# Patient Record
Sex: FEMALE | Race: WHITE | NOT HISPANIC OR LATINO | Employment: FULL TIME | URBAN - METROPOLITAN AREA
[De-identification: names, ages, dates, MRNs, and addresses within clinical notes are randomized per-mention and may not be internally consistent; named-entity substitution may affect disease eponyms.]

---

## 2019-05-07 ENCOUNTER — APPOINTMENT (OUTPATIENT)
Dept: RADIOLOGY | Facility: MEDICAL CENTER | Age: 49
End: 2019-05-07
Attending: EMERGENCY MEDICINE
Payer: COMMERCIAL

## 2019-05-07 ENCOUNTER — HOSPITAL ENCOUNTER (EMERGENCY)
Facility: MEDICAL CENTER | Age: 49
End: 2019-05-07
Attending: EMERGENCY MEDICINE
Payer: COMMERCIAL

## 2019-05-07 VITALS
DIASTOLIC BLOOD PRESSURE: 102 MMHG | HEART RATE: 75 BPM | HEIGHT: 67 IN | RESPIRATION RATE: 17 BRPM | WEIGHT: 201.06 LBS | BODY MASS INDEX: 31.56 KG/M2 | TEMPERATURE: 97.9 F | SYSTOLIC BLOOD PRESSURE: 169 MMHG | OXYGEN SATURATION: 96 %

## 2019-05-07 DIAGNOSIS — R20.2 PARESTHESIA: ICD-10-CM

## 2019-05-07 DIAGNOSIS — H53.8 BLURRED VISION, LEFT EYE: ICD-10-CM

## 2019-05-07 LAB
ABO + RH BLD: NORMAL
ABO GROUP BLD: NORMAL
ALBUMIN SERPL BCP-MCNC: 4.8 G/DL (ref 3.2–4.9)
ALBUMIN/GLOB SERPL: 1.8 G/DL
ALP SERPL-CCNC: 77 U/L (ref 30–99)
ALT SERPL-CCNC: 16 U/L (ref 2–50)
ANION GAP SERPL CALC-SCNC: 11 MMOL/L (ref 0–11.9)
APTT PPP: 26.7 SEC (ref 24.7–36)
AST SERPL-CCNC: 17 U/L (ref 12–45)
BASOPHILS # BLD AUTO: 0.8 % (ref 0–1.8)
BASOPHILS # BLD: 0.08 K/UL (ref 0–0.12)
BILIRUB SERPL-MCNC: 0.3 MG/DL (ref 0.1–1.5)
BLD GP AB SCN SERPL QL: NORMAL
BUN SERPL-MCNC: 9 MG/DL (ref 8–22)
CALCIUM SERPL-MCNC: 9.6 MG/DL (ref 8.5–10.5)
CHLORIDE SERPL-SCNC: 103 MMOL/L (ref 96–112)
CO2 SERPL-SCNC: 25 MMOL/L (ref 20–33)
CREAT SERPL-MCNC: 0.79 MG/DL (ref 0.5–1.4)
EKG IMPRESSION: NORMAL
EOSINOPHIL # BLD AUTO: 0.11 K/UL (ref 0–0.51)
EOSINOPHIL NFR BLD: 1.2 % (ref 0–6.9)
ERYTHROCYTE [DISTWIDTH] IN BLOOD BY AUTOMATED COUNT: 41.3 FL (ref 35.9–50)
GLOBULIN SER CALC-MCNC: 2.7 G/DL (ref 1.9–3.5)
GLUCOSE BLD-MCNC: 90 MG/DL (ref 65–99)
GLUCOSE SERPL-MCNC: 91 MG/DL (ref 65–99)
HCT VFR BLD AUTO: 45.2 % (ref 37–47)
HGB BLD-MCNC: 15.4 G/DL (ref 12–16)
IMM GRANULOCYTES # BLD AUTO: 0.04 K/UL (ref 0–0.11)
IMM GRANULOCYTES NFR BLD AUTO: 0.4 % (ref 0–0.9)
INR PPP: 0.94 (ref 0.87–1.13)
LYMPHOCYTES # BLD AUTO: 2.09 K/UL (ref 1–4.8)
LYMPHOCYTES NFR BLD: 22 % (ref 22–41)
MCH RBC QN AUTO: 29.8 PG (ref 27–33)
MCHC RBC AUTO-ENTMCNC: 34.1 G/DL (ref 33.6–35)
MCV RBC AUTO: 87.6 FL (ref 81.4–97.8)
MONOCYTES # BLD AUTO: 0.79 K/UL (ref 0–0.85)
MONOCYTES NFR BLD AUTO: 8.3 % (ref 0–13.4)
NEUTROPHILS # BLD AUTO: 6.41 K/UL (ref 2–7.15)
NEUTROPHILS NFR BLD: 67.3 % (ref 44–72)
NRBC # BLD AUTO: 0 K/UL
NRBC BLD-RTO: 0 /100 WBC
PLATELET # BLD AUTO: 335 K/UL (ref 164–446)
PMV BLD AUTO: 10.3 FL (ref 9–12.9)
POTASSIUM SERPL-SCNC: 3.7 MMOL/L (ref 3.6–5.5)
PROT SERPL-MCNC: 7.5 G/DL (ref 6–8.2)
PROTHROMBIN TIME: 12.7 SEC (ref 12–14.6)
RBC # BLD AUTO: 5.16 M/UL (ref 4.2–5.4)
RH BLD: NORMAL
SODIUM SERPL-SCNC: 139 MMOL/L (ref 135–145)
TROPONIN I SERPL-MCNC: <0.01 NG/ML (ref 0–0.04)
WBC # BLD AUTO: 9.5 K/UL (ref 4.8–10.8)

## 2019-05-07 PROCEDURE — 84484 ASSAY OF TROPONIN QUANT: CPT

## 2019-05-07 PROCEDURE — 80053 COMPREHEN METABOLIC PANEL: CPT

## 2019-05-07 PROCEDURE — 85610 PROTHROMBIN TIME: CPT

## 2019-05-07 PROCEDURE — 82962 GLUCOSE BLOOD TEST: CPT

## 2019-05-07 PROCEDURE — 99285 EMERGENCY DEPT VISIT HI MDM: CPT

## 2019-05-07 PROCEDURE — 85025 COMPLETE CBC W/AUTO DIFF WBC: CPT

## 2019-05-07 PROCEDURE — 71045 X-RAY EXAM CHEST 1 VIEW: CPT

## 2019-05-07 PROCEDURE — 85730 THROMBOPLASTIN TIME PARTIAL: CPT

## 2019-05-07 PROCEDURE — 70450 CT HEAD/BRAIN W/O DYE: CPT

## 2019-05-07 PROCEDURE — 36415 COLL VENOUS BLD VENIPUNCTURE: CPT

## 2019-05-07 PROCEDURE — 700102 HCHG RX REV CODE 250 W/ 637 OVERRIDE(OP): Performed by: EMERGENCY MEDICINE

## 2019-05-07 PROCEDURE — A9270 NON-COVERED ITEM OR SERVICE: HCPCS | Performed by: EMERGENCY MEDICINE

## 2019-05-07 PROCEDURE — 86900 BLOOD TYPING SEROLOGIC ABO: CPT

## 2019-05-07 PROCEDURE — 93005 ELECTROCARDIOGRAM TRACING: CPT | Performed by: EMERGENCY MEDICINE

## 2019-05-07 PROCEDURE — 86901 BLOOD TYPING SEROLOGIC RH(D): CPT

## 2019-05-07 PROCEDURE — 86850 RBC ANTIBODY SCREEN: CPT

## 2019-05-07 PROCEDURE — 99285 EMERGENCY DEPT VISIT HI MDM: CPT | Performed by: PSYCHIATRY & NEUROLOGY

## 2019-05-07 RX ORDER — ASPIRIN 81 MG/1
324 TABLET, CHEWABLE ORAL ONCE
Status: COMPLETED | OUTPATIENT
Start: 2019-05-07 | End: 2019-05-07

## 2019-05-07 RX ORDER — LISINOPRIL 10 MG/1
10 TABLET ORAL DAILY
COMMUNITY

## 2019-05-07 RX ADMIN — ASPIRIN 81 MG 324 MG: 81 TABLET ORAL at 19:44

## 2019-05-08 NOTE — ED PROVIDER NOTES
"ED Provider  Scribed for Rex Koenig D.O. by Briana Sol. 5/7/2019  6:28 PM    Means of arrival:walk-in  History obtained from:patient  History limited by: none    CHIEF COMPLAINT  Chief Complaint   Patient presents with   • Blurred Vision     bilat w/ loss of vision in L   • Dizziness   • Headache   • Tingling     lips and tongue & L fingertips       HPI  Kiana Wells is a 48 y.o. female who presents as a possible stroke. Patient reports that around 330PM this afternoon she developed lightheadedness, headache, tingling along the lips and tongue, tingling and weakness along the left upper extremity, blurred vision bilaterally and loss of vision in the left eye. She reports that the first symptoms she developed was the vision changes, with all the other symptoms beginning shortly after. Patient reports that at this time, symptoms have gradually begun to improve, however, are still present.    REVIEW OF SYSTEMS  See HPI for further details. All other systems are negative.     PAST MEDICAL HISTORY   has a past medical history of Fibromyalgia and Hypertension.    SOCIAL HISTORY  Social History     Social History Main Topics   • Smoking status: Former Smoker   • Alcohol use Yes      Comment: 1-2 in evening   • Drug use: No       SURGICAL HISTORY  patient denies any surgical history    CURRENT MEDICATIONS    Current Facility-Administered Medications:   •  aspirin (ASA) chewable tab 324 mg, 324 mg, Oral, Once, Rex Koenig D.O.    Current Outpatient Prescriptions:   •  lisinopril (PRINIVIL) 10 MG Tab, Take 10 mg by mouth every day., Disp: , Rfl:     ALLERGIES  Allergies   Allergen Reactions   • Pcn [Penicillins]        PHYSICAL EXAM  VITAL SIGNS: BP (!) 169/102   Pulse 88   Temp 36.6 °C (97.9 °F) (Temporal)   Resp 16   Ht 1.702 m (5' 7\")   Wt 91.2 kg (201 lb 1 oz)   SpO2 100%   BMI 31.49 kg/m²     Pulse ox interpretation: pulse ox is normal.  Constitutional: Alert in no apparent distress.  HENT: No signs of " trauma, Bilateral external ears normal, Nose normal.   Eyes: Pupils are equal and reactive, Conjunctiva normal, Non-icteric.   Neck: Normal range of motion, No tenderness, Supple, No stridor.   Lymphatic: No lymphadenopathy noted.   Cardiovascular: Regular rate and rhythm, no murmurs.   Thorax & Lungs: Normal breath sounds, No respiratory distress, No wheezing, No chest tenderness.   Abdomen: Soft, No tenderness, No masses, No pulsatile masses. No peritoneal signs.  Skin: Warm, Dry, No erythema, No rash.   Extremities: Intact distal pulses, No edema, No tenderness, No cyanosis,  Negative Palma's sign.   Musculoskeletal: Good range of motion in all major joints. No tenderness to palpation or major deformities noted.   Neurologic: Alert , NIH 4, cranial nerves grossly intact, drift in LUE with coordination deficit, mild weakness noted LE as well, sensation normal bilaterally   Psychiatric: Affect normal, Judgment normal, Mood normal.       MEDICAL DECISION MAKING  This is a 48 y.o. female who presents with complaints of blurred vision, tingling in the face and hands and patient had a headache with this also.  Her headache did resolve as well as all her symptoms without any intervention.  His CT shows no signs of stroke or injury.  Patient was evaluated in the department by neurologist, neurologist opinion is that the patient is stable for discharge home with a negative CT.  Her symptoms did resolve subjectively and objectively.  I did place her on aspirin here and she is to continue aspirin at home and to follow-up with neurology in the clinic in 2 weeks.  She is to return if her symptoms return or if any changes or worsens.    DIAGNOSTIC STUDIES / PROCEDURES    EKG  12 Lead EKG interpreted by me as above    LABS  Results for orders placed or performed during the hospital encounter of 05/07/19   CBC WITH DIFFERENTIAL   Result Value Ref Range    WBC 9.5 4.8 - 10.8 K/uL    RBC 5.16 4.20 - 5.40 M/uL    Hemoglobin 15.4  12.0 - 16.0 g/dL    Hematocrit 45.2 37.0 - 47.0 %    MCV 87.6 81.4 - 97.8 fL    MCH 29.8 27.0 - 33.0 pg    MCHC 34.1 33.6 - 35.0 g/dL    RDW 41.3 35.9 - 50.0 fL    Platelet Count 335 164 - 446 K/uL    MPV 10.3 9.0 - 12.9 fL    Neutrophils-Polys 67.30 44.00 - 72.00 %    Lymphocytes 22.00 22.00 - 41.00 %    Monocytes 8.30 0.00 - 13.40 %    Eosinophils 1.20 0.00 - 6.90 %    Basophils 0.80 0.00 - 1.80 %    Immature Granulocytes 0.40 0.00 - 0.90 %    Nucleated RBC 0.00 /100 WBC    Neutrophils (Absolute) 6.41 2.00 - 7.15 K/uL    Lymphs (Absolute) 2.09 1.00 - 4.80 K/uL    Monos (Absolute) 0.79 0.00 - 0.85 K/uL    Eos (Absolute) 0.11 0.00 - 0.51 K/uL    Baso (Absolute) 0.08 0.00 - 0.12 K/uL    Immature Granulocytes (abs) 0.04 0.00 - 0.11 K/uL    NRBC (Absolute) 0.00 K/uL   COMP METABOLIC PANEL   Result Value Ref Range    Sodium 139 135 - 145 mmol/L    Potassium 3.7 3.6 - 5.5 mmol/L    Chloride 103 96 - 112 mmol/L    Co2 25 20 - 33 mmol/L    Anion Gap 11.0 0.0 - 11.9    Glucose 91 65 - 99 mg/dL    Bun 9 8 - 22 mg/dL    Creatinine 0.79 0.50 - 1.40 mg/dL    Calcium 9.6 8.5 - 10.5 mg/dL    AST(SGOT) 17 12 - 45 U/L    ALT(SGPT) 16 2 - 50 U/L    Alkaline Phosphatase 77 30 - 99 U/L    Total Bilirubin 0.3 0.1 - 1.5 mg/dL    Albumin 4.8 3.2 - 4.9 g/dL    Total Protein 7.5 6.0 - 8.2 g/dL    Globulin 2.7 1.9 - 3.5 g/dL    A-G Ratio 1.8 g/dL   PROTHROMBIN TIME   Result Value Ref Range    PT 12.7 12.0 - 14.6 sec    INR 0.94 0.87 - 1.13   APTT   Result Value Ref Range    APTT 26.7 24.7 - 36.0 sec   TROPONIN   Result Value Ref Range    Troponin I <0.01 0.00 - 0.04 ng/mL   ESTIMATED GFR   Result Value Ref Range    GFR If African American >60 >60 mL/min/1.73 m 2    GFR If Non African American >60 >60 mL/min/1.73 m 2   ACCU-CHEK GLUCOSE   Result Value Ref Range    Glucose - Accu-Ck 90 65 - 99 mg/dL   EKG (NOW)   Result Value Ref Range    Report       [facility] Emergency Dept.    Test Date:  2019-05-07  Pt Name:    JORDY                      Department: ER  MRN:        [id number]                      Room:       RD 02  Gender:     Female                       Technician: [id number]  :        1970                   Requested By:ELEAZAR BEY  Order #:    [id number]                    Reading MD: ELEAZAR BEY D.O.    Measurements  Intervals                                Axis  Rate:       71                           P:          24  NH:         148                          QRS:        -15  QRSD:       86                           T:          19  QT:         400  QTc:        435    Interpretive Statements  SINUS RHYTHM  LEFT VENTRICULAR HYPERTROPHY  No previous ECG available for comparison    Electronically Signed On 2019 19:28:56 PDT by ELEAZAR BEY D.O.           RADIOLOGY  CT-HEAD W/O   Final Result      No CT evidence of acute infarct, hemorrhage or mass.      DX-CHEST-PORTABLE (1 VIEW)    (Results Pending)       COURSE  Pertinent Labs & Imaging studies reviewed. (See chart for details)    6:28 PM - Patient seen and examined at charge desk. Discussed plan of care, including evaluating for any acute process with imaging. Patient agrees to the plan of care.Ordered for chest xray, head CT, CBC, CMP, PTT, APTT, COD, troponin ,UA, and EKG to evaluate her symptoms.  Differential diagnoses include but not limited to: TIA, CVA, paresthesias, complex migraine.    6:31 PM Dr. Cruz, Neurology at bedside. At this time patient is not a candidate for Alteplase, as symptoms have begun to resolve and are minimal at this time.    6:54 PM Spoke with Dr. Cruz who has spoken with the patient further. She reports a history of migraines with visual disturbances. Patient is now describing her visual changes as if she's looking through a prism.. Plan at this time is to treat the migraine and evaluate with a CT. If the CT is negative, she will be discharged.    7:29 PM I re-evaluated patient at bedside and updated her on her  work up results. I informed her that her imaging is negative for stroke at this time. Patient reports that her symptoms have all resolved at this time. She is stable for discharge.    The patient will return for new or worsening symptoms and is stable at the time of discharge.    DISPOSITION:  Patient will be discharged home in stable condition.    FOLLOW UP:  Dharmesh Cruz M.D.  [address] [place] [id number]  [phone]          FINAL IMPRESSION  1. Paresthesia    2. Blurred vision, left eye         IBriana (Scribe), am scribing for, and in the presence of, Rex Koenig D.O..    Electronically signed by: Briana Sol (Scribe), 5/7/2019    IRex D.O. personally performed the services described in this documentation, as scribed by Briana Sol in my presence, and it is both accurate and complete.    The note accurately reflects work and decisions made by me.  Rex Koenig  5/7/2019  9:44 PM

## 2019-05-08 NOTE — DISCHARGE INSTRUCTIONS
Take 1 full aspirin every day.  Please follow-up with Dr. Cruz as scheduled.  Return for any change worsening or any repeat of the symptoms.

## 2019-05-08 NOTE — ED TRIAGE NOTES
Amb to triage w/ c/o dizziness, headache, tingling to lips and tongue & L fingertips, blurred vision & loss of vision to L eye, slight weakness to LUE.  Onset 1530.  Symptoms improving.  Pt still c/o mild, dizzines, blurred vision & tingling to lips.

## 2019-05-08 NOTE — ED NOTES
"Pt discharged home via ambulatory to lobby with steady gait, family accompanying. IV discontinued and gauze placed, pt in possession of belongings. Pt provided discharge education and information pertaining to medications and follow up appointments. Pt received copy of discharge instructions and verbalized understanding.     BP (!) 169/102   Pulse 75   Temp 36.6 °C (97.9 °F) (Temporal)   Resp 17   Ht 1.702 m (5' 7\")   Wt 91.2 kg (201 lb 1 oz)   SpO2 96%   BMI 31.49 kg/m²   "

## 2019-05-08 NOTE — CONSULTS
"[facility] Neurology Stroke Consult:    Referring Physician: Rex Koenig D.O.    Reason for consultation: Possible stroke    Last Known Well: 15:30  Time of TPA Decision: No TPA due to NIHSS of 0.     HPI: Kiana Wells is a 48 y.o. female with history of fibromyalgia and hypertension presenting to the hospital for dizziness, tingling, loss of vision on L and consulted for stroke. Patient developed lightheadedness, headache, tingling along lips and tongue and LUE, with bilateral blurred vision and loss of vision on L eye. She describes the visual field defect as \"looking through a prism\", although she could see on both eyes. She also states that she has a history of migraines, but had a \"procedure\" done through her nose which helped her in the past. Previously they were associated with her menstrual cycle, however today is not close to her cycle. EMS was called and stroke alert was activated. No TPA due to NIHSS of 0 at the time of my evaluation.     ROS:     As above. All other systems reviewed and are negative.    Past Medical History:    has a past medical history of Fibromyalgia and Hypertension. Hx of migraines.    FHx:  No Fhx of stroke    SHx:   reports that she has quit smoking. She does not have any smokeless tobacco history on file. She reports that she drinks alcohol. She reports that she does not use drugs.    Allergies:  Allergies   Allergen Reactions   • Pcn [Penicillins]        Medications:  No current facility-administered medications for this encounter.   No current outpatient prescriptions on file.    Vitals:   Vitals:    05/07/19 1813 05/07/19 1818   BP: (!) 169/102    Pulse: 88    Resp: 16    Temp: 36.6 °C (97.9 °F)    TempSrc: Temporal    SpO2: 100%    Weight:  91.2 kg (201 lb 1 oz)   Height: 1.702 m (5' 7\")        Labs:  No results found for: PROTHROMBTM, INR   No results found for: WBC, RBC, HEMOGLOBIN, HEMATOCRIT, MCV, MCH, MCHC, MPV, NEUTSPOLYS, LYMPHOCYTES, MONOCYTES, EOSINOPHILS, BASOPHILS, " HYPOCHROMIA, ANISOCYTOSIS   No results found for: SODIUM, POTASSIUM, CHLORIDE, CO2, GLUCOSE, BUN, CREATININE, BUNCREATRAT, GLOMRATE   No results found for: CHOLSTRLTOT, LDL, HDL, TRIGLYCERIDE    No results found for: ALKPHOSPHAT, ASTSGOT, ALTSGPT, TBILIRUBIN     Imaging:  CT Head W/O CST personally reviewed, w/o evidence of stroke or hemorrhage.    Physical Exam:     General:   Cardio: Normal S1/S2. No peripheral edema.   Pulm: CTAX2. No respiratory distress.   Skin: Warm, dry, no rashes or lesions   Psychiatric: Appropriate affect. No active psychosis.  HEENT: Atraumatic head, normal sclera and conjunctiva, moist oral mucosa. No lid lag.  Abdomen: Soft, non tender. No masses or hepatosplenomegaly.    Physical Exam:    NIH Stroke Scale:    1a. Level of Consciousness (Alert, drowsy, etc): 0= Alert    1b. LOC Questions (Month, age): 0= Answers both correctly    1c. LOC Commands (Open/close eyes make fist/let go): 0= Obeys both correctly    2.   Best Gaze (Eyes open - patient follows examiner's finger on face): 0= Normal    3.   Visual Fields (introduce visual stimulus/threat to patient's field quadrants): 0= No visual loss  4.   Facial Paresis (Show teeth, raise eyebrows and squeeze eyes shut): 0= Normal     5a. Motor Arm - Left (Elevate arm to 90 degrees if patient is sitting, 45 degrees if  supine): 0= No drift    5b. Motor Arm - Right (Elevate arm to 90 degrees if patient is sitting, 45 degrees if supine): 0= No drift    6a. Motor Leg - Left (Elevate leg 30 degrees with patient supine): 0= No drift    6b. Motor Leg - Right  (Elevate leg 30 degrees with patient supine): 0= No drift    7.   Limb Ataxia (Finger-nose, heel down shin): 0= No ataxia    8.   Sensory (Pin prick to face, arm, trunk and leg - compare side to side): 0= Normal    9.  Best Language (Name item, describe a picture and read sentences): 0= No aphasia    10. Dysarthria (Evaluate speech clarity by patient repeating listed words): 0= Normal  "articulation    11. Extinction and Inattention (Use information from prior testing to identify neglect or  double simultaneous stimuli testing): 0= No neglect    Total NIH Score: 0    Assessment/Plan:    Kiana Wells is a 48 y.o. female with history of fibromyalgia and hypertension presenting to the hospital for dizziness, tingling, loss of vision on L and consulted for stroke. Symptoms are improving, and NIHSS was 0. Therefore, no TPA will be given at this time. Symptoms of tingling on tongue, L arm, L vision loss, lightheadedness and headache could potentially be c/w posterior circulation infarction however given NIHSS of 0, less likely. Other possibilities include possible migraine headache. On my exam, there was some minor blurred vision, but without corresponding visual defect in other eye. Given the hx of a \"looking through a prism\", it's likely that today's episode is c/w migraine.    Plan:  -CT Head W/O CST w/o evidence of hemorrhage.  -Migraine tx per ERP.  -F/U with neurology outpatient.  -Neurology signing off, please call w/ any further questions or concerns.  -Plan discussed with consulting physician and patient's nurse      Dharmesh Cruz M.D., Diplomat of the American Board of Psychiatry and Neurology  Diplomat of [facility]   Assistant Clinical Professor, [facility] Neurology Consultant        "